# Patient Record
Sex: FEMALE | Race: WHITE
[De-identification: names, ages, dates, MRNs, and addresses within clinical notes are randomized per-mention and may not be internally consistent; named-entity substitution may affect disease eponyms.]

---

## 2018-02-27 ENCOUNTER — HOSPITAL ENCOUNTER (OUTPATIENT)
Dept: HOSPITAL 80 - FIMAGING | Age: 33
End: 2018-02-27
Attending: OPHTHALMOLOGY
Payer: COMMERCIAL

## 2018-02-27 DIAGNOSIS — H02.841: Primary | ICD-10-CM

## 2018-02-27 DIAGNOSIS — H54.7: ICD-10-CM

## 2018-07-17 ENCOUNTER — HOSPITAL ENCOUNTER (OUTPATIENT)
Dept: HOSPITAL 80 - FIMAGING | Age: 33
End: 2018-07-17
Payer: COMMERCIAL

## 2018-07-17 DIAGNOSIS — Z30.431: Primary | ICD-10-CM

## 2019-01-31 ENCOUNTER — HOSPITAL ENCOUNTER (EMERGENCY)
Dept: HOSPITAL 80 - FED | Age: 34
LOS: 1 days | Discharge: HOME | End: 2019-02-01
Payer: COMMERCIAL

## 2019-01-31 DIAGNOSIS — E86.9: ICD-10-CM

## 2019-01-31 DIAGNOSIS — N83.201: ICD-10-CM

## 2019-01-31 DIAGNOSIS — R10.31: Primary | ICD-10-CM

## 2019-01-31 DIAGNOSIS — N94.89: ICD-10-CM

## 2019-01-31 LAB — PLATELET # BLD: 242 10^3/UL (ref 150–400)

## 2019-01-31 NOTE — EDPHY
H & P


Stated Complaint: RLQ PAIN, RADIATING TO BACK X 11 HOURS





- Personal History


LMP (Females 10-55): Unknown


Current Tetanus Diphtheria and Acellular Pertussis (TDAP): Yes





- Medical/Surgical History


Hx Asthma: No


Hx Chronic Respiratory Disease: No


Hx Diabetes: No


Hx Cardiac Disease: No


Hx Renal Disease: No


Hx Cirrhosis: No


Hx Alcoholism: No


Hx HIV/AIDS: No


Hx Splenectomy or Spleen Trauma: No


Other PMH: R EYE SURGERIES





- Social History


Smoking Status: Never smoked


Time Seen by Provider: 01/31/19 20:56


Constitutional: 


 Initial Vital Signs











Temperature (C)  36.4 C   01/31/19 20:45


 


Heart Rate  86   01/31/19 20:45


 


Respiratory Rate  16   01/31/19 20:45


 


Blood Pressure  130/79 H  01/31/19 20:45


 


O2 Sat (%)  97   01/31/19 20:45








 











O2 Delivery Mode               Room Air














Allergies/Adverse Reactions: 


 





No Known Allergies Allergy (Verified 01/31/19 20:44)


 








Home Medications: 














 Medication  Instructions  Recorded


 


NK [No Known Home Meds]  01/31/19














Medical Decision Making





- Diagnostics


Imaging Results: 


 Imaging Impressions





Abdomen Ultrasound  01/31/19 21:12


Impression: Possible mesenteric adenitis. No indirect evidence of appendicitis. 


 


Results discussed with Dr. Eli at 10:39 PM.








Pelvic/Renal Ultrasound  01/31/19 21:12


Impression: 1. Prominent pelvic venous structures raise the possibility of 

pelvic venous congestion syndrome.


2. Small amount of free fluid adjacent to the right ovary. Perhaps this patient 

has ruptured a small cyst.


 


Results discussed with Dr. Matamoros at 10:38 PM.








Abdomen CT  01/31/19 23:01


Impression: 1. Proximal colonic constipation.


2. Normal appendix .


3. 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


4. Right adnexal cyst (dominant follicle) with a small amount of adjacent free 

fluid confirmed.


5. Incidental liver cysts and nonobstructive nephrolithiasis.


 


 Results discussed with Dr. Eli at 11:33 PM.


 


General information for patients regarding this examination can be found at 

[email or web address].


 


If you have questions or comments about this report, please contact me at [phone] (hospital) or [phone] (cell). 


 











ED Course/Re-evaluation: 





CHIEF COMPLAINT:  Right lower abdominal pain





HISTORY OF PRESENT ILLNESS: 


The patient is a 32 y/o female complaining of sudden onset right lower 

abdominal pain, onset 11 hours ago. She initially thought that this pain was 

due to gas. However, the pain persisted and she had to leave work early. She 

was able to drive home, but felt uncomfortable due to the consistent pain. When 

she presses on her abdomen the pain increases and is always in her right lower 

quadrant even if she doesn't press there. Last PO was at 18:00, 3 hours ago. 

She was hungry, but didn't eat as much as normal due to the discomfort. No fever

, headache, chest pain, shortness of breath, urinary or bowel complaints, 

numbness, paresthesias.





REVIEW OF SYSTEMS:  





A comprehensive 10 system review of systems is otherwise negative aside from 

elements mentioned in the history of present illness and medical decision 

making.





PHYSICAL EXAM:  





HR, BP, O2 Sat, RR.  Temp noted


General Appearance:  Alert, well hydrated, appropriate, and non-toxic appearing.


Head:  Atraumatic without scalp tenderness or obvious injury


Eyes:  Pupils equal, round, reactive to light and accommodation, EOMI, no trauma

, no injection.


Ears:  Clear bilaterally, no perforation, normal landmarks


Nose:  Atraumatic, no rhinorrhea, clear.


Throat:  There is no erythema or exudates, no lesions, normal tonsils, mucus 

membranes moist.


Neck:  Supple, 2+ carotid upstroke, nontender, no lymphadenopathy.


Respiratory:  No retractions, no distress, no wheezes, and no accessory muscle 

use.  Lungs are clear to auscultation bilaterally.


Cardiovascular:  Regular rate and rhythm, no murmurs, rubs, or gallops. 

Bilateral carotid, radial, dorsalis pedis, and posterior tibial pulses intact. 

Good capillary refill all extremities.


Gastrointestinal: RLQ tenderness to palpation, positive McBurney's point, 

positive peritoneal signs. Abdomen is soft, non-distended, no masses, no rebound

, no guarding.


Musculoskeletal:  Normal active ROM of all extremities, atraumatic.


Neurological:  Alert, appropriate, and interactive.  The patient has normal 

DTRs and non-focal cranial nerves, motor, sensory, and cerebellar exam.


Skin:  No rashes, good turgor, no nodules on palpation.





Past medical history: Denies


Past surgical history: Right eye surgeries


Family history: Denies


Social history: Lives in [place], , employed





DIAGNOSTICS/PROCEDURES/CRITICAL CARE TIME:  





Abdominal and pelvic US:





DIFFERENTIAL DIAGNOSIS:   


The differential diagnosis for the patient's abdominal pain included but was 

not limited to ovarian cyst, pelvic inflammatory disease, ovarian torsion, 

urinary tract infection, ectopic pregnancy, cholecystitis, and appendicitis.





MEDICAL DECISION MAKING: The patient is a 32 y/o female complaining of sudden 

onset right lower abdominal pain, onset 11 hours ago. On exam she has right 

lower quadrant tenderness with palpation. She does have a positive McBurney's 

point. Labs and abdominal and pelvic US ordered; 1L IV NS, 30mg IV Toradol, and 

100mcg IV Fentanyl administered.





0925: Patient has a WBC of 10.95 ; imaging still pending.





2240: Patient care turned over to Dr. Medina at shift change. Imaging studies 

still pending.


 (Micky Matamoros)





2301:  Patient was signed over to me at 11:00 p.m..  Follow-up patient's 

ultrasounds.  The patient has had 2 ultrasounds 1 that was looking for her 

appendix and the other that was evaluating her ovaries.  Appendix was unable to 

be visualized on the ultrasound, however the ultrasound of her pelvis did show 

a small right ovarian cyst with a small amount of free fluid.  This may be the 

cause of her pain however I went and talked to her extensively and reexamine 

there and she still has significant right lower quadrant abdominal pain.  We 

had a discussion about further imaging including a CT scan abdomen pelvis with 

IV contrast a additionally rule out appendicitis given her ultrasound did not 

show this.  After long discussion risk versus benefit was discussed she would 

like to proceed with CT scan to rule out appendicitis.  Additionally the 

patient states that she still has significant pain.  I have ordered her 2nd L 

fluid as well as 0.5 mg IV Dilaudid.  She initially declined any pain 

medication.  She did received Toradol earlier.








CT scan abdomen pelvis with IV contrast called to me by Dr. Oppenheimer.  The 

patient has a normal appendix visualized.





I updated the patient about these results.  Clinically I feel that her pain is 

due to a ruptured ovarian cyst.





Explain should follow up with OBGYN.


Also there is some concern about pelvic congestion syndrome on ultrasound and 

CT.  I have discussed this with the patient.  I do not believe this is causing 

her acute sudden-onset pain.





Recommend follow up with OBGYN





Return precautions discussed with the patient she understands return emergency 

room if develops worsening abdominal pain, pelvic pain, vomiting, fever, not 

doing well.





Patient requesting and Norco take-home pack which I will provide for her.  She 

feels much better after IV Dilaudid.  Abdomen is soft nontender at this time.  

She feels comfortable going home.





I discussed return precautions with her she understands return emergency room 

she develops worsening abdominal pain, pelvic pain, fever, vomiting, not doing 

well. (Landon Medina)





- Data Points


Laboratory Results: 


 Laboratory Results





 01/31/19 21:05 





 01/31/19 21:05 





 











  01/31/19 01/31/19 01/31/19





  21:58 21:41 21:05


 


WBC      





    


 


RBC      





    


 


Hgb      





    


 


POC Hgb    17.0 gm/dL H gm/dL  





    (12.6-16.3)  


 


Hct      





    


 


POC Hct    50 % H %  





    (38-47)  


 


MCV      





    


 


MCH      





    


 


MCHC      





    


 


RDW      





    


 


Plt Count      





    


 


MPV      





    


 


Neut % (Auto)      





    


 


Lymph % (Auto)      





    


 


Mono % (Auto)      





    


 


Eos % (Auto)      





    


 


Baso % (Auto)      





    


 


Nucleat RBC Rel Count      





    


 


Absolute Neuts (auto)      





    


 


Absolute Lymphs (auto)      





    


 


Absolute Monos (auto)      





    


 


Absolute Eos (auto)      





    


 


Absolute Basos (auto)      





    


 


Absolute Nucleated RBC      





    


 


Immature Gran %      





    


 


Immature Gran #      





    


 


POC Sodium    142 mEq/L mEq/L  





    (135-145)  


 


Sodium      





    


 


POC Potassium    3.7 mEq/L mEq/L  





    (3.3-5.0)  


 


Potassium      





    


 


POC Chloride    104 mEq/L mEq/L  





    ()  


 


Chloride      





    


 


Carbon Dioxide      





    


 


POC Total CO2    25 mEq/L mEq/L  





    (22-31)  


 


Anion Gap      





    


 


POC BUN    16 mg/dL mg/dL  





    (7-23)  


 


BUN      





    


 


Creatinine      





    


 


POC Creatinine    0.8 mg/dL mg/dL  





    (0.6-1.0)  


 


Estimated GFR      





    


 


Glucose      





    


 


POC Glucose    92 mg/dL mg/dL  





    ()  


 


Calcium      





    


 


Total Bilirubin      





    


 


Conjugated Bilirubin      





    


 


Unconjugated Bilirubin      





    


 


AST      





    


 


ALT      





    


 


Alkaline Phosphatase      





    


 


Total Protein      





    


 


Albumin      





    


 


Lipase      





    


 


Beta HCG, Qual      NEGATIVE 





    


 


Urine Color  PALE YELLOW     





    


 


Urine Appearance  CLEAR     





    


 


Urine pH  6.0     





   (5.0-7.5)   


 


Ur Specific Gravity  1.011     





   (1.002-1.030)   


 


Urine Protein  NEGATIVE     





   (NEGATIVE)   


 


Urine Ketones  NEGATIVE     





   (NEGATIVE)   


 


Urine Blood  NEGATIVE     





   (NEGATIVE)   


 


Urine Nitrate  NEGATIVE     





   (NEGATIVE)   


 


Urine Bilirubin  NEGATIVE     





   (NEGATIVE)   


 


Urine Urobilinogen  NEGATIVE EU EU    





   (0.2-1.0)   


 


Ur Leukocyte Esterase  NEGATIVE     





   (NEGATIVE)   


 


Urine RBC  1-3 /hpf /hpf    





   (0-3)   


 


Urine WBC  1-3 /hpf /hpf    





   (0-3)   


 


Ur Epithelial Cells  TRACE /lpf /lpf    





   (NONE-1+)   


 


Urine Bacteria  TRACE /hpf H /hpf    





   (NONE SEEN)   


 


Urine Mucus  TRACE /lpf /lpf    





   (NONE-1+)   


 


Urine Glucose  NEGATIVE     





   (NEGATIVE)   














  01/31/19 01/31/19





  21:05 21:05


 


WBC    10.95 10^3/uL H 10^3/uL





    (3.80-9.50) 


 


RBC    4.77 10^6/uL 10^6/uL





    (4.18-5.33) 


 


Hgb    14.8 g/dL g/dL





    (12.6-16.3) 


 


POC Hgb    





   


 


Hct    44.8 % %





    (38.0-47.0) 


 


POC Hct    





   


 


MCV    93.9 fL fL





    (81.5-99.8) 


 


MCH    31.0 pg pg





    (27.9-34.1) 


 


MCHC    33.0 g/dL g/dL





    (32.4-36.7) 


 


RDW    12.7 % %





    (11.5-15.2) 


 


Plt Count    242 10^3/uL 10^3/uL





    (150-400) 


 


MPV    9.8 fL fL





    (8.7-11.7) 


 


Neut % (Auto)    56.8 % %





    (39.3-74.2) 


 


Lymph % (Auto)    35.9 % %





    (15.0-45.0) 


 


Mono % (Auto)    5.8 % %





    (4.5-13.0) 


 


Eos % (Auto)    0.7 % %





    (0.6-7.6) 


 


Baso % (Auto)    0.5 % %





    (0.3-1.7) 


 


Nucleat RBC Rel Count    0.0 % %





    (0.0-0.2) 


 


Absolute Neuts (auto)    6.21 10^3/uL 10^3/uL





    (1.70-6.50) 


 


Absolute Lymphs (auto)    3.93 10^3/uL H 10^3/uL





    (1.00-3.00) 


 


Absolute Monos (auto)    0.64 10^3/uL 10^3/uL





    (0.30-0.80) 


 


Absolute Eos (auto)    0.08 10^3/uL 10^3/uL





    (0.03-0.40) 


 


Absolute Basos (auto)    0.06 10^3/uL 10^3/uL





    (0.02-0.10) 


 


Absolute Nucleated RBC    0.00 10^3/uL 10^3/uL





    (0-0.01) 


 


Immature Gran %    0.3 % %





    (0.0-1.1) 


 


Immature Gran #    0.03 10^3/uL 10^3/uL





    (0.00-0.10) 


 


POC Sodium    





   


 


Sodium  142 mEq/L mEq/L  





   (135-145)  


 


POC Potassium    





   


 


Potassium  4.2 mEq/L mEq/L  





   (3.5-5.2)  


 


POC Chloride    





   


 


Chloride  106 mEq/L mEq/L  





   ()  


 


Carbon Dioxide  23 mEq/l mEq/l  





   (22-31)  


 


POC Total CO2    





   


 


Anion Gap  13 mEq/L mEq/L  





   (6-14)  


 


POC BUN    





   


 


BUN  17 mg/dL mg/dL  





   (7-23)  


 


Creatinine  0.8 mg/dL mg/dL  





   (0.6-1.0)  


 


POC Creatinine    





   


 


Estimated GFR  > 60   





   


 


Glucose  97 mg/dL mg/dL  





   ()  


 


POC Glucose    





   


 


Calcium  10.0 mg/dL mg/dL  





   (8.5-10.4)  


 


Total Bilirubin  0.4 mg/dL mg/dL  





   (0.1-1.4)  


 


Conjugated Bilirubin  0.2 mg/dL mg/dL  





   (0.0-0.5)  


 


Unconjugated Bilirubin  0.2 mg/dL mg/dL  





   (0.0-1.1)  


 


AST  21 IU/L IU/L  





   (14-46)  


 


ALT  19 IU/L IU/L  





   (9-52)  


 


Alkaline Phosphatase  76 IU/L IU/L  





   ()  


 


Total Protein  8.7 g/dL H g/dL  





   (6.3-8.2)  


 


Albumin  5.2 g/dL H g/dL  





   (3.5-5.0)  


 


Lipase  106 IU/L IU/L  





   ()  


 


Beta HCG, Qual    





   


 


Urine Color    





   


 


Urine Appearance    





   


 


Urine pH    





   


 


Ur Specific Gravity    





   


 


Urine Protein    





   


 


Urine Ketones    





   


 


Urine Blood    





   


 


Urine Nitrate    





   


 


Urine Bilirubin    





   


 


Urine Urobilinogen    





   


 


Ur Leukocyte Esterase    





   


 


Urine RBC    





   


 


Urine WBC    





   


 


Ur Epithelial Cells    





   


 


Urine Bacteria    





   


 


Urine Mucus    





   


 


Urine Glucose    





   











Medications Given: 


 








Discontinued Medications





Fentanyl (Sublimaze)  100 mcg IVP EDNOW ONE


   Stop: 01/31/19 21:13


   Last Admin: 01/31/19 23:05 Dose:  Not Given


Hydromorphone HCl (Dilaudid)  0.5 mg IVP EDNOW ONE


   Stop: 01/31/19 23:02


   Last Admin: 01/31/19 23:04 Dose:  0.5 mg


Sodium Chloride (Ns)  1,000 mls @ 0 mls/hr IV EDNOW ONE; Wide [facility]


   PRN Reason: Protocol


   Stop: 01/31/19 21:13


   Last Admin: 01/31/19 21:32 Dose:  1,000 mls


Ketorolac Tromethamine (Toradol)  30 mg IVP EDNOW ONE


   Stop: 01/31/19 21:13


   Last Admin: 01/31/19 21:32 Dose:  30 mg





Point of Care Test Results: 


 Chemistry











  01/31/19





  21:41


 


POC Sodium  142 mEq/L mEq/L





   (135-145) 


 


POC Potassium  3.7 mEq/L mEq/L





   (3.3-5.0) 


 


POC Chloride  104 mEq/L mEq/L





   () 


 


POC Total CO2  25 mEq/L mEq/L





   (22-31) 


 


POC BUN  16 mg/dL mg/dL





   (7-23) 


 


POC Creatinine  0.8 mg/dL mg/dL





   (0.6-1.0) 


 


POC Glucose  92 mg/dL mg/dL





   () 








 ISTAT H&H











  01/31/19





  21:41


 


POC Hgb  17.0 gm/dL H gm/dL





   (12.6-16.3) 


 


POC Hct  50 % H %





   (38-47) 














Departure





- Departure


Disposition: Home, Routine, Self-Care


Clinical Impression: 


 Abdominal pain, Ovarian cyst, Ruptured ovarian cyst





Condition: Good


Instructions:  Ovarian Cyst (ED), Acute Abdominal Pain (ED), Pelvic Pain in 

Women (ED), Abdominal Pain (ED), Ruptured Ovarian Cyst (ED)


Referrals: 


Joy Kerns MD [Primary Care Provider] - As per Instructions


Yesica Wilhelm MD [Medical Doctor] - As per Instructions


Report Scribed for: Micky Matamoros


Report Scribed by: Gilma Wagner


Date of Report: 01/31/19


Time of Report: 20:58

## 2019-02-01 VITALS — DIASTOLIC BLOOD PRESSURE: 75 MMHG | SYSTOLIC BLOOD PRESSURE: 115 MMHG
